# Patient Record
Sex: FEMALE | URBAN - NONMETROPOLITAN AREA
[De-identification: names, ages, dates, MRNs, and addresses within clinical notes are randomized per-mention and may not be internally consistent; named-entity substitution may affect disease eponyms.]

---

## 2019-06-04 ENCOUNTER — APPOINTMENT (RX ONLY)
Dept: URBAN - NONMETROPOLITAN AREA CLINIC 13 | Facility: CLINIC | Age: 64
Setting detail: DERMATOLOGY
End: 2019-06-04

## 2019-06-04 DIAGNOSIS — Z41.9 ENCOUNTER FOR PROCEDURE FOR PURPOSES OTHER THAN REMEDYING HEALTH STATE, UNSPECIFIED: ICD-10-CM

## 2019-06-04 PROCEDURE — ? BOTOX (U OR CC)

## 2019-06-04 ASSESSMENT — LOCATION SIMPLE DESCRIPTION DERM
LOCATION SIMPLE: GLABELLA
LOCATION SIMPLE: LEFT FOREHEAD
LOCATION SIMPLE: RIGHT FOREHEAD
LOCATION SIMPLE: GLABELLA

## 2019-06-04 ASSESSMENT — LOCATION DETAILED DESCRIPTION DERM
LOCATION DETAILED: GLABELLA
LOCATION DETAILED: LEFT INFERIOR MEDIAL FOREHEAD
LOCATION DETAILED: GLABELLA
LOCATION DETAILED: RIGHT INFERIOR MEDIAL FOREHEAD

## 2019-06-04 ASSESSMENT — LOCATION ZONE DERM
LOCATION ZONE: FACE
LOCATION ZONE: FACE

## 2019-06-04 NOTE — PROCEDURE: BOTOX (U OR CC)
Additional Area 2 Units: 0
Consent: Written consent obtained. Risks include but not limited to lid/brow ptosis, bruising, swelling, diplopia, temporary effect, incomplete chemical denervation.
Lot #: [id number]
Detail Level: Detailed
Expiration Date (Month Year): 10/2021
Dilution (U/0.1 Cc): 10
Post-Care Instructions: Patient instructed to not lie down for 4 hours and limit physical activity for 24 hours. Patient instructed not to travel by airplane for 48 hours.
Glabellar Complex Units: 9
Including Pricing Information In The Note: No
Document As Units Or Cc?: units
Quantity Per Injection Site (Units Or Cc): 9 U

## 2019-08-21 ENCOUNTER — APPOINTMENT (RX ONLY)
Dept: URBAN - NONMETROPOLITAN AREA CLINIC 13 | Facility: CLINIC | Age: 64
Setting detail: DERMATOLOGY
End: 2019-08-21

## 2019-08-21 DIAGNOSIS — Z41.9 ENCOUNTER FOR PROCEDURE FOR PURPOSES OTHER THAN REMEDYING HEALTH STATE, UNSPECIFIED: ICD-10-CM

## 2019-08-21 PROCEDURE — ? BOTOX

## 2019-08-21 NOTE — PROCEDURE: BOTOX
Nasal Root Units: 0
Additional Area 1 Location: orbicularis oris
Price (Use Numbers Only, No Special Characters Or $): 300
Consent: Written consent obtained. Risks include but not limited to lid/brow ptosis, bruising, swelling, diplopia, temporary effect, incomplete chemical denervation.
Post-Care Instructions: Patient instructed to not lie down for 4 hours or touch treated areas and limit physical activity for 24 hours.\\nPatient was discussed with Dr Dye who approved and ordered this treatment plan
Inferior Lateral Orbicularis Oculi Units: 12
Glabellar Complex Units: 9
Detail Level: Detailed
Forehead Units: 6
Additional Area 1 Units: 3
Lot #: [id number]
Dilution (U/0.1 Cc): 1
Expiration Date (Month Year): 01/22

## 2020-01-22 ENCOUNTER — APPOINTMENT (RX ONLY)
Dept: URBAN - NONMETROPOLITAN AREA CLINIC 13 | Facility: CLINIC | Age: 65
Setting detail: DERMATOLOGY
End: 2020-01-22

## 2020-01-22 DIAGNOSIS — Z41.9 ENCOUNTER FOR PROCEDURE FOR PURPOSES OTHER THAN REMEDYING HEALTH STATE, UNSPECIFIED: ICD-10-CM

## 2020-01-22 PROCEDURE — ? BOTOX

## 2020-01-22 NOTE — PROCEDURE: BOTOX
Additional Area 2 Location: MicroTox  to periorbital area; administrated intradermally @ 5u/0.1cc. Area numbed prior to administration.
Karla Pollack   MRN: [id number]    Department:  [facility] Emergency Department   Date of Visit:  8/11/2018           Disclosure     Insurance plans vary and the physician(s) referred by the ER may not be covered by your plan.  Please contact yo
Periorbital Skin Units: 0
tell this physician (or your personal doctor if your instructions are to return to your personal doctor) about any new or lasting problems. The primary care or specialist physician will see patients referred from the [facility] Emergency Department.  Kyler Laird
Expiration Date (Month Year): 08/2022
Lot #: [id number]
Inferior Lateral Orbicularis Oculi Units: 8
Show Additional Area 2: Yes
Show Right And Left Pupillary Line Units: No
Detail Level: Detailed
Post-Care Instructions: Patient instructed to not lie down for 4 hours or touch treated areas and limit physical activity for 24 hours.\\n\\nPatient was discussed with  Dr Dye who approved and ordered this treatment plan.
Price (Use Numbers Only, No Special Characters Or $): 044
Dilution (U/0.1 Cc): 10
Glabellar Complex Units: 20
Additional Area 3 Location: orbicularis oris
Consent: Written consent obtained. Risks include but not limited to lid/brow ptosis, bruising, swelling, diplopia, temporary effect, incomplete chemical denervation.